# Patient Record
Sex: FEMALE | Race: WHITE
[De-identification: names, ages, dates, MRNs, and addresses within clinical notes are randomized per-mention and may not be internally consistent; named-entity substitution may affect disease eponyms.]

---

## 2020-09-07 ENCOUNTER — HOSPITAL ENCOUNTER (INPATIENT)
Dept: HOSPITAL 94 - ADULT MH | Age: 50
LOS: 7 days | Discharge: ANOTHER HOSPITAL | DRG: 751 | End: 2020-09-14
Attending: PSYCHIATRY & NEUROLOGY | Admitting: PSYCHIATRY & NEUROLOGY
Payer: MEDICAID

## 2020-09-07 VITALS — SYSTOLIC BLOOD PRESSURE: 125 MMHG | DIASTOLIC BLOOD PRESSURE: 69 MMHG

## 2020-09-07 VITALS — HEIGHT: 67 IN | WEIGHT: 124.89 LBS | BODY MASS INDEX: 19.6 KG/M2

## 2020-09-07 VITALS — SYSTOLIC BLOOD PRESSURE: 124 MMHG | DIASTOLIC BLOOD PRESSURE: 66 MMHG

## 2020-09-07 DIAGNOSIS — Z98.891: ICD-10-CM

## 2020-09-07 DIAGNOSIS — G89.29: ICD-10-CM

## 2020-09-07 DIAGNOSIS — Z82.49: ICD-10-CM

## 2020-09-07 DIAGNOSIS — M54.9: ICD-10-CM

## 2020-09-07 DIAGNOSIS — F29: ICD-10-CM

## 2020-09-07 DIAGNOSIS — F33.3: Primary | ICD-10-CM

## 2020-09-07 DIAGNOSIS — F15.10: ICD-10-CM

## 2020-09-07 DIAGNOSIS — Z90.49: ICD-10-CM

## 2020-09-07 DIAGNOSIS — F39: ICD-10-CM

## 2020-09-07 DIAGNOSIS — Z71.6: ICD-10-CM

## 2020-09-07 DIAGNOSIS — M41.9: ICD-10-CM

## 2020-09-07 DIAGNOSIS — I25.2: ICD-10-CM

## 2020-09-07 DIAGNOSIS — F17.210: ICD-10-CM

## 2020-09-07 PROCEDURE — 80061 LIPID PANEL: CPT

## 2020-09-07 PROCEDURE — 99285 EMERGENCY DEPT VISIT HI MDM: CPT

## 2020-09-07 PROCEDURE — 87081 CULTURE SCREEN ONLY: CPT

## 2020-09-07 PROCEDURE — 36415 COLL VENOUS BLD VENIPUNCTURE: CPT

## 2020-09-07 PROCEDURE — 83036 HEMOGLOBIN GLYCOSYLATED A1C: CPT

## 2020-09-07 NOTE — NUR
Admission Note: Pt. admitted at approximately 1645 from [facility], arrived in a w/c accompanied by 
tech and security. Pt. was originally BIB EMS after being found in front of an AM/PM store 
confused, disoriented, with rapid/pressured speech, and speaking in tongues. Pt. also c/o 
feeling like she was having a heart attack or a stroke, she was medically cleared at [facility]. 
Pt's toxicology screen was positive for amphetamines. She reported while there that she has 
received mental health treatment since the age of 20. Pt. was somewhat cooperative with the 
admission process, although she required frequent direction/redirection r/t tangental 
thought process and rapid, disorganized speech.

## 2020-09-07 NOTE — NUR
Nursing Progress Note:[]



Legal hold:[]



Client on voluntary/involuntary status for GD/DTS/DTO[].



Report received from nurse with use of SBAR[].



Why are they here:

 Pt. admitted at approximately 1645 from [facility], arrived in a w/c accompanied by tech and 
security. Pt. was originally BIB EMS after being found in front of an AM/PM store confused, 
disoriented, with rapid/pressured speech, and speaking in tongues. Pt. also c/o feeling like 
she was having a heart attack or a stroke, she was medically cleared at [facility]. Pt's toxicology 
screen was positive for amphetamines. She reported while there that she has received mental 
health treatment since the age of 20. Pt. was somewhat cooperative with the admission 
process, although she required frequent direction/redirection r/t tangental thought process 
and rapid, disorganized speech. 







What has happened this shift:Pt was in bed eating her dinner tray at shift change. Attempted 
to interview but pt speech was Tangential rapid with scattered thoughts. Pt was med 
compliant and went to sleep.

S/I, H/I: She denies S/I at this time, 

A/VH: Denies

Sleep: See sleep hrs

ADL's: Requires encouragement, 

Group attendance: No

Were meds taken: Yes

Any med S/E: None





Mental Status Exam



Appearance: Hair disheveled, however appropriately dressed

Eye contact: Fair, pt. 

Behavior: Cooperative, fatigued, anxious, withdrawn, and isolative

Speech: tangential

Mood: Guarded and withdrawn

Affect: Constricted

Thought process:  disorganization

Thought Content:  scattered

Cognition: A&O X(not to place)

Insight: Poor

Judgment: Poor







Interventions



PRN's used: none

Therapeutic interventions: Introduced self and established rapport, maintained a safe and 
supportive environment, ensured contract for safety, provided clear and simple instructions, 
attempted to orient to reality encouraged participation on the unit and provided direction 
as needed,  and maintained Q 15 min safety checks. 

Restraints/seclusion/emergency medication: N/A





Justification of Continued Inpatient Treatment: Pt. requires interruption of current crisis, 
medication adjustments, and a safe and supportive environment.

## 2020-09-08 VITALS — SYSTOLIC BLOOD PRESSURE: 132 MMHG | DIASTOLIC BLOOD PRESSURE: 77 MMHG

## 2020-09-08 VITALS — DIASTOLIC BLOOD PRESSURE: 77 MMHG | SYSTOLIC BLOOD PRESSURE: 132 MMHG

## 2020-09-08 VITALS — SYSTOLIC BLOOD PRESSURE: 150 MMHG | DIASTOLIC BLOOD PRESSURE: 95 MMHG

## 2020-09-08 LAB
CHOLEST SERPL-MCNC: 142 MG/DL (ref 0–200)
CHOLEST/HDLC SERPL: 2.5 {RATIO} (ref 0–4.99)
HBA1C MFR BLD: 5.8 % (ref 4.5–6.2)
HDLC SERPL-MCNC: 56 MG/DL (ref 35–60)
LDLC SERPL DIRECT ASSAY-MCNC: 71 MG/DL (ref 50–100)
TRIGL SERPL-MCNC: 57 MG/DL (ref 20–135)

## 2020-09-08 RX ADMIN — NICOTINE SCH PATCH: 21 PATCH, EXTENDED RELEASE TRANSDERMAL at 07:09

## 2020-09-08 NOTE — NUR
Nursing Progress Note:



Legal hold: 5150



Client on involuntary status for DTS



Report received from nurse with use of SBAR: SARATH Barba



Why are they here: Pt. admitted from [facility], arrived in a w/c accompanied by tech and security. 
Pt. was originally BIB EMS after being found in front of an AM/PM store confused, 
disoriented, with rapid/pressured speech, and speaking in tongues. Pt. also c/o feeling like 
she was having a heart attack or a stroke, she was medically cleared at [facility]. Pt's toxicology 
screen was positive for amphetamines. She reported while there that she has received mental 
health treatment since the age of 20. Pt. was somewhat cooperative with the admission 
process, although she required frequent direction/redirection r/t tangental thought process 
and rapid, disorganized speech. 



Assessment



What has happened this shift: Received pt. sleeping in bed, she awoke for AM blood draw and 
immediately presented with rapid disorganized speech and random strange behaviors. Pt. was 
singing and dancing in the hallway, making faces and knocking on the nurses station window, 
and pacing in the hallways. This writer provided redirection and reminded pt. that her peers 
were trying to sleep. Pt. was only able to be partially redirected, however continued to 
exhibit random behaviors and appeared anxious, PRN Ativan administered with effectiveness. 
Pt. attended breakfast in the Group Room, and 1:1 completed afterwards at bedside. During 
assessment, pt. was talking baby talk stated, "Goo goo, ga ga." However, was able to be 
intermittently redirected to answer simple yes or no questions. Pt. denies all MH s/s, 
however admits to feeling a little depressed and anxious. She makes intermittent delusional 
statements, states, "I'm Getachew's daughter." Pt. attended all groups, however again began to 
exhibit anxiety and with strange intrusive behaviors in the afternoon, additional PRN Ativan 
administered with effectiveness. Pt. napped in the afternoon, will continue to monitor. 

S/I, H/I: Denies

A/VH: Denies, however appears distracted and internally preoccupied

Sleep: Pt. reports she slept well, sleep hours are 10

ADL's: Pt. requires direction from staff

Group attendance: Yes

Were meds taken: Yes

Any med S/E: None





Mental Status Exam



Appearance: Hair disheveled, however appropriately dressed

Eye contact: Good, intense at times

Behavior: Cooperative, restless, impulsive, anxious, and hypomanic

Speech: Hyperverbal, disorganized, and mostly nonsensical

Mood: Pleasant and anxious

Affect: Animated

Thought process: Tangental and disorganized

Thought Content: Possible A/V/HA and delusions

Cognition: A&O X2

Insight: Poor

Judgment: Poor



Interventions



PRN's used: Ativan X2 and Tylenol

Therapeutic interventions: Maintained a safe and supportive environment, ensured contract 
for safety, provided clear and simple instructions, attempted to orient to reality, 
encouraged participation on the unit and provided direction, provided medication education, 
provided active listening and positive encouragement, and maintained Q 15 min safety checks. 


Restraints/seclusion/emergency medication: N/A





Justification of Continued Inpatient Treatment: Pt. requires interruption of current crisis, 
medication adjustments, and a safe and supportive environment.

## 2020-09-08 NOTE — NUR
Nursing Progress Note:



Legal hold: 5150



Client on involuntary status for DTS



Report received from nurse with use of SBAR: Zachary RN



Why are they here: Pt. admitted from [facility], arrived in a w/c accompanied by tech and security. 
Pt. was originally BIB EMS after being found in front of an AM/PM store confused, 
disoriented, with rapid/pressured speech, and speaking in tongues. Pt. also c/o feeling like 
she was having a heart attack or a stroke, she was medically cleared at [facility]. Pt's toxicology 
screen was positive for amphetamines. She reported while there that she has received mental 
health treatment since the age of 20. Pt. was somewhat cooperative with the admission 
process, although she required frequent direction/redirection r/t tangental thought process 
and rapid, disorganized speech. 



Assessment



What has happened this shift: Patient was wandering the halls talking to her self. and being 
intrusive to her peers. Her speech is disorganized and talkes in circles. She makes 
statements like your my grand father but he is the shimon of music. my family are singers and 
thats why [place] erupted. It spewed snow. Pt was given prn Ativan and was less 
intrusive and after med pass pt went to bed.

S/I, H/I: Denies

A/VH: Denies, however appears distracted and internally preoccupied

Sleep: Pt. reports she slept well, sleep hours are 10

ADL's: Pt. requires direction from staff

Group attendance: Yes

Were meds taken: Yes

Any med S/E: None





Mental Status Exam



Appearance: Hair disheveled, however appropriately dressed

Eye contact: Good, intense at times

Behavior: Cooperative, restless, impulsive, anxious, and hypomanic

Speech: Hyperverbal, disorganized, and mostly nonsensical

Mood: Pleasant and anxious

Affect: Animated

Thought process: Tangental and disorganized

Thought Content: Possible A/V/HA and delusions

Cognition: A&O X2

Insight: Poor

Judgment: Poor



Interventions



PRN's used: Ativan 

Therapeutic interventions: Maintained a safe and supportive environment, ensured contract 
for safety, provided clear and simple instructions, attempted to orient to reality, 
encouraged participation on the unit and provided direction, provided medication education, 
provided active listening and positive encouragement, and maintained Q 15 min safety checks. 


Restraints/seclusion/emergency medication: N/A





Justification of Continued Inpatient Treatment: Pt. requires interruption of current crisis, 
medication adjustments, and a safe and supportive environment.

## 2020-09-08 NOTE — NUR
Continued Group Art Therapy Notes: This was the first time this patient attended group art 
therapy. This 

patient was eager to participate, was pleasant and motivated to draw. During the course of 
the group session, this patient complete 5 successive drawings. She could not follow the 
directives, however this therapist kept the patient "in paper" to allow her a place to 'draw 
and release her what appeared to be excessive emotional content. At one point the patient 
began drawing on the tables, which is why providing a supply of paper seemed important.  It 
was difficult for this 

therapist to determine the content of her conversation.  On paper, it was observed that 
several different names were repeated.  Patient presented as friendly, however interaction 
with her peers was challenging (for others 

as again observed by this therapist. No further communications transpired.



Erin Acosta MA (Alena Marie), [facility] #[id number]

[facility], Art Therapist

-------------------------------------------------------------------------------

Addendum: 09/08/20 at 1757 by Erin EDWARDS

-------------------------------------------------------------------------------

Amended: Links added.

## 2020-09-09 VITALS — SYSTOLIC BLOOD PRESSURE: 118 MMHG | DIASTOLIC BLOOD PRESSURE: 75 MMHG

## 2020-09-09 VITALS — SYSTOLIC BLOOD PRESSURE: 111 MMHG | DIASTOLIC BLOOD PRESSURE: 65 MMHG

## 2020-09-09 RX ADMIN — NICOTINE SCH PATCH: 21 PATCH, EXTENDED RELEASE TRANSDERMAL at 08:16

## 2020-09-09 NOTE — NUR
Nursing Progress Note:



Legal hold: 5150



Client on involuntary status for DTS



Report received from nurse with use of SBAR: Zachary RN



Why are they here: Pt. admitted from [facility], arrived in a w/c accompanied by tech and security. 
Pt. was originally BIB EMS after being found in front of an AM/PM store confused, 
disoriented, with rapid/pressured speech, and speaking in tongues. Pt. also c/o feeling like 
she was having a heart attack or a stroke, she was medically cleared at [facility]. Pt's toxicology 
screen was positive for amphetamines. She reported while there that she has received mental 
health treatment since the age of 20. Pt. was somewhat cooperative with the admission 
process, although she required frequent direction/redirection r/t tangental thought process 
and rapid, disorganized speech. 



Assessment



What has happened this shift: Patient was in her room at change of shift talking to her 
self. She stated I'm talking to my Grand father. She told this writer "I can see your soul 
and future." She would  aphone and dial few numbers and have a long conversation and 
you could hear the recording call can not be completed as dilled. Pt was med compliant and 
less intrusive this shift.

S/I, H/I: Denies

A/VH: Denies, however appears distracted and internally preoccupied

Sleep: Pt. reports she slept well, sleep hours are 7.25, naps during the day

ADL's: Pt. requires direction from staff

Group attendance: Yes

Were meds taken: Yes

Any med S/E: None





Mental Status Exam



Appearance: Hair disheveled, however appropriately dressed

Eye contact: Good, intense at times

Behavior: Cooperative, restless, impulsive, anxious, and hypomanic

Speech: Hyperverbal, disorganized, and mostly nonsensical

Mood: Pleasant and anxious

Affect: Animated

Thought process: Tangental and disorganized

Thought Content: Possible A/V/HA and delusions

Cognition: A&O X2

Insight: Poor

Judgment: Poor



Interventions



PRN's used: Ativan X1 and Tylenol

Therapeutic interventions: Maintained a safe and supportive environment, ensured contract 
for safety, provided clear and simple instructions, attempted to orient to reality, 
encouraged participation on the unit and provided direction, provided medication education, 
provided active listening and positive encouragement, and maintained Q 15 min safety checks. 


Restraints/seclusion/emergency medication: N/A





Justification of Continued Inpatient Treatment: Pt. requires interruption of current crisis, 
medication adjustments, and a safe and supportive environment.

## 2020-09-09 NOTE — NUR
Nursing Progress Note:



Legal hold: 5150



Client on involuntary status for DTS



Report received from nurse with use of SBAR: SARATH Barba



Why are they here: Pt. admitted from [facility], arrived in a w/c accompanied by tech and security. 
Pt. was originally BIB EMS after being found in front of an AM/PM store confused, 
disoriented, with rapid/pressured speech, and speaking in tongues. Pt. also c/o feeling like 
she was having a heart attack or a stroke, she was medically cleared at [facility]. Pt's toxicology 
screen was positive for amphetamines. She reported while there that she has received mental 
health treatment since the age of 20. Pt. was somewhat cooperative with the admission 
process, although she required frequent direction/redirection r/t tangental thought process 
and rapid, disorganized speech. 



Assessment



What has happened this shift: Received pt. sleeping in bed, she was awoken by staff to 
attend breakfast, and afterwards retreated back to bed. Pt. presented as more clear this 
morning and greets this writer appropriately, states, "I slept well." However, after 
breakfast pt. again began to present with strange behaviors AEB singing loudly and dancing 
in the hallway and was observed to be talking aloud to herself in a disorganized and 
nonsensical manner. Pt. isolated and slept throughout the morning and awoke in the 
afternoon. 1:1 completed at bedside, pt. continues to deny all MH s/s, however admits to 
having anxiety. When questioned by this writer in regard to her anxiety, pt. states, "I had 
six abortions." She reports she has two living children in [place] where she is from, 
however then begins to make delusional statements. She reports she has not seen her children 
because she has been on a, "Yazdanism walk to [place]." Pt. then reports that she has WINNIE, 
and when asked to clarify this, stated, "It's extra-sensory perception, I can control the 
people around me." Pt. then began to speak in a non-sense what appeared to be a made-up 
language. She exhibited increased anxiety AEB pacing the hallway and becoming intrusive with 
peers and was only able to be partially redirected, PRN Ativan administered with 
effectiveness. Pt. attended art group and remained up throughout the afternoon, continuing 
to attempt to interact with her peers, however continues to present as disorganized with 
rapid non-sensical speech. 

S/I, H/I: Denies

A/VH: Denies, however appears distracted and internally preoccupied

Sleep: Pt. reports she slept well, sleep hours are 7.25, naps during the day

ADL's: Pt. requires direction from staff

Group attendance: Yes

Were meds taken: Yes

Any med S/E: None





Mental Status Exam



Appearance: Hair disheveled, however appropriately dressed

Eye contact: Good, intense at times

Behavior: Cooperative, restless, impulsive, anxious, and hypomanic

Speech: Hyperverbal, disorganized, and mostly nonsensical

Mood: Pleasant and anxious

Affect: Animated

Thought process: Tangental and disorganized

Thought Content: Possible A/V/HA and delusions

Cognition: A&O X2

Insight: Poor

Judgment: Poor



Interventions



PRN's used: Ativan X1 and Tylenol

Therapeutic interventions: Maintained a safe and supportive environment, ensured contract 
for safety, provided clear and simple instructions, attempted to orient to reality, 
encouraged participation on the unit and provided direction, provided medication education, 
provided active listening and positive encouragement, and maintained Q 15 min safety checks. 


Restraints/seclusion/emergency medication: N/A





Justification of Continued Inpatient Treatment: Pt. requires interruption of current crisis, 
medication adjustments, and a safe and supportive environment.

## 2020-09-10 VITALS — SYSTOLIC BLOOD PRESSURE: 112 MMHG | DIASTOLIC BLOOD PRESSURE: 52 MMHG

## 2020-09-10 VITALS — DIASTOLIC BLOOD PRESSURE: 84 MMHG | SYSTOLIC BLOOD PRESSURE: 127 MMHG

## 2020-09-10 RX ADMIN — NICOTINE SCH PATCH: 21 PATCH, EXTENDED RELEASE TRANSDERMAL at 07:50

## 2020-09-10 NOTE — NUR
Nursing Progress Note:



Legal hold: 5150



Client on involuntary status for DTS



Report received from RN with use of SBAR



Why are they here: Pt. admitted from [facility], arrived in a w/c accompanied by tech and security. 
Pt. was originally BIB EMS after being found in front of an AM/PM store confused, 
disoriented, with rapid/pressured speech, and speaking in tongues. Pt. also c/o feeling like 
she was having a heart attack or a stroke, she was medically cleared at [facility]. Pt's toxicology 
screen was positive for amphetamines. She reported while there that she has received mental 
health treatment since the age of 20. Pt. was somewhat cooperative with the admission 
process, although she required frequent direction/redirection r/t tangental thought process 
and rapid, disorganized speech. 



Assessment



What has happened this shift: Received pt. sleeping in bed w/o distress at beginning of 
shift. Pt was cooperative with vitals, AM meds, and morning assessments, although she speaks 
in word salad and disorganized.  Pt ate meals well with others and attended part of the AM 
group. Pt paced halls a lot with a Bible in her hand and making statements that were partial 
quotes from the bible mixed with delusional and unrelated statements. Pt shows concern for 
her roommate and encourages her to engage in daily life/activities. Pt is intrusive at times 
and handles redirection ok. It is difficult to have a meaningful conversation as she starts 
to freely associate and make random statements about words spoken by others in her vicinity. 




S/I, H/I: Denies

A/VH: Denies, however appears distracted and internally preoccupied

Sleep: None this shift

ADL's: Pt. requires direction from staff

Group attendance: Yes

Were meds taken: Yes

Any med S/E: None





Mental Status Exam



Appearance: Hair disheveled, however appropriately dressed

Eye contact: Good, intense at times

Behavior: Cooperative, restless, impulsive, anxious, and hypomanic

Speech: Hyperverbal, disorganized

Mood: Pleasant and anxious

Affect: Animated

Thought process: Tangental and disorganized

Thought Content: Possible A/V/HA and delusions

Cognition: A&O X2

Insight: Poor

Judgment: Poor



Interventions



PRN's used: Ativan and Tyllenol

Therapeutic interventions: Maintained a safe and supportive environment, ensured contract 
for safety, provided clear and simple instructions, attempted to orient to reality, 
encouraged participation on the unit and provided direction, provided medication education, 
provided active listening and positive encouragement, and maintained Q 15 min safety checks. 




Restraints/seclusion/emergency medication: N/A



Justification of Continued Inpatient Treatment: Pt. requires interruption of current crisis, 
medication adjustments, and a safe and supportive environment.

## 2020-09-11 VITALS — SYSTOLIC BLOOD PRESSURE: 122 MMHG | DIASTOLIC BLOOD PRESSURE: 82 MMHG

## 2020-09-11 VITALS — DIASTOLIC BLOOD PRESSURE: 84 MMHG | SYSTOLIC BLOOD PRESSURE: 137 MMHG

## 2020-09-11 RX ADMIN — NICOTINE SCH PATCH: 21 PATCH, EXTENDED RELEASE TRANSDERMAL at 08:44

## 2020-09-11 RX ADMIN — LOPERAMIDE HYDROCHLORIDE PRN MG: 2 CAPSULE ORAL at 11:26

## 2020-09-11 NOTE — NUR
Group Therapy:  Process Group



This Clinicians goals for this process group were as follows: (1) Ask scaling questions 
about Patients current anxiety, depression, and irritability symptoms as a check-in. (2) 
Share psychoeducation about emotional escalation as it relates to stress and negative 
symptoms, Fight, flight, freeze. (2) Provide psychoeducation on the STOPP acronym: Stop, 
Take a Breath, Observe the situation, Put things into perspective, and, Practice what works. 
(3) Share psychoeducation on principles of mindfulness and emotional relaxation techniques 
that Patients may utilize to reduce the acuity of unwanted emotional escalation. (5) 
Process Clients thoughts and reflections on this topic within the group milieu.  



Patient identified experiencing the following levels of anxiety, depression, and 
anger/irritability while present in the group milieu (0-low; 10-High).



Anxiety: 1/10

Depression: 2/10

Anger/irritability: 0/10



Patient presented as properly oriented to person, and place during the process group. 
Patient was dressed in green hospital scrubs within the milieu. Patient frequently moved 
around in her chair while uttering incoherent sounds. Patient's thought content was 
disorganized.  Patient's thought process was scattered. She engaged in the behavior of 
echolalia, often repeating words and phrases that this Clinician spoke out loud. Patient 
presented as slightly hyperverbal. She maintained intermittent eye contact with this 
Clinician. Patient presented in calm, euphoric mood, during the process group. Patient 
provided answers to scaling questions about the intensity of her anxiety, depression, and 
anger/irritability symptoms.  Approximately 15 minutes into the process group, Patient 
became incontinent and left the group room, which served as a distraction.  [facility] staff 
assisted with the situation and this Clinician continued to facilitate the process group.



Ankit Murray MA, WINDY


-------------------------------------------------------------------------------

Addendum: 09/14/20 at 0821 by Ankit Murray 

-------------------------------------------------------------------------------

Amended: Links added.

## 2020-09-11 NOTE — NUR
Nursing Progress Note:

Legal hold: 5150

Client on involuntary status for DTS

Report received from Tim CLEMENS with use of SBAR

Why are they here: Pt. admitted from [facility], arrived in a w/c accompanied by tech and security. 
Pt. was originally BIB EMS after being found in front of an AM/PM store confused, 
disoriented, with rapid/pressured speech, and speaking in tongues. Pt. also c/o feeling like 
she was having a heart attack or a stroke, she was medically cleared at [facility]. Pt's toxicology 
screen was positive for amphetamines. She reported while there that she has received mental 
health treatment since the age of 20. Pt. was somewhat cooperative with the admission 
process, although she required frequent direction/redirection r/t tangental thought process 
and rapid, disorganized speech. 

Assessment

What has happened this shift: RN received pt. awake, pacing in hallways and talking loudly 
to herself and read her Bible out loud. Pt. ate breakfast and took medications. 1:1 done at 
bedside, pt. denies SI/HI, A/V hallucinations. Pt. carrying on nonsensical conversation 
while RN assessed her. Pt. saying words in Danish, when RN asked her about her language 
ability pt. stated, I speak 26 languages. Pt. reports her mood is good. Pt. c/o 
constipation and given milk of magnesia. Pt. was incontinent of urine during group. Shortly 
after pt. was incontinent of stool x2, with diarrhea. Pt. given Imodium and showered. Pt. 
carries on continuous conversation with herself, singing, and reading the Bible throughout 
the day. 

S/I, H/I: Denies

A/VH: Denies but responding to internal stimuli. 

Sleep: Did not nap

ADL's: Needs prompting. Pt. showered after bouts of incontinence.  

Group attendance: No

Were meds taken: Yes

Any med S/E: None

Mental Status Exam

Appearance: Clean but disheveled, wearing unit scrubs. 

Eye contact: Good, intense at times

Behavior: Cooperative, restless, impulsive, anxious, and hypomanic

Speech: Hyperverbal, pressured, disorganized speech

Mood: Pleasant but anxious

Affect: Animated

Thought process: Tangential, disorganized, delusions. 

Thought Content: Delusions

Cognition: A&O X3 (Not to circumstance)

Insight: Poor

Judgment: Poor

Interventions

PRN's used: Ativan, MOM, Imodium. 

Therapeutic interventions: Maintained a safe and supportive environment, ensured contract 
for safety, provided clear and simple instructions, attempted to orient to reality, 
encouraged participation on the unit and provided direction, provided medication education, 
provided active listening and positive encouragement, and maintained Q 15 min safety checks. 


Restraints/seclusion/emergency medication: N/A

Justification of Continued Inpatient Treatment: Pt. requires interruption of current crisis, 
medication adjustments, and a safe and supportive environment.

## 2020-09-11 NOTE — NUR
Nursing Progress Note:



Legal hold: 5150



Client on involuntary status for DTS



Report received from nurse with use of SBAR: Zachary RN



Why are they here: Pt. admitted from [facility], arrived in a w/c accompanied by tech and security. 
Pt. was originally BIB EMS after being found in front of an AM/PM store confused, 
disoriented, with rapid/pressured speech, and speaking in tongues. Pt. also c/o feeling like 
she was having a heart attack or a stroke, she was medically cleared at [facility]. Pt's toxicology 
screen was positive for amphetamines. She reported while there that she has received mental 
health treatment since the age of 20. Pt. was somewhat cooperative with the admission 
process, although she required frequent direction/redirection r/t tangental thought process 
and rapid, disorganized speech. 



Assessment



What has happened this shift: Pt was active on unit all evening, walking the halls and 
playing with puzzle pieces and torn up pieces of paper.  pt is disorganized and can't carry 
on a conversation due to her pressured speech.  She will speak nonstop about nonsensical 
things when trying to ascertain how she is doing.  Pt showered and had snack with other 
pt's, and despite being disorganized, pt is cooperative and friendly.  



S/I, H/I: Denies

A/VH: Denies, however appears distracted and internally preoccupied

Sleep: see sleep assessment

ADL's: Pt. requires direction from staff , showered tonight

Group attendance: Yes

Were meds taken: Yes

Any med S/E: None





Mental Status Exam



Appearance: wnl

Eye contact: fair

Behavior: restless, impulsive, anxious, and hypomanic

Speech: Hyperverbal, disorganized, and mostly nonsensical

Mood: Pleasant and anxious

Affect: Animated

Thought process: Tangental and disorganized

Thought Content: Possible A/V/HA and delusions

Cognition: A&O X2

Insight: Poor

Judgment: Poor



Interventions



PRN's used: Ativan X1 and Tylenol

Therapeutic interventions: Maintained a safe and supportive environment, ensured contract 
for safety, provided clear and simple instructions, attempted to orient to reality, 
encouraged participation on the unit and provided direction, provided medication education, 
provided active listening and positive encouragement, and maintained Q 15 min safety checks. 


Restraints/seclusion/emergency medication: N/A





Justification of Continued Inpatient Treatment: Pt. requires interruption of current crisis, 
medication adjustments, and a safe and supportive environment.

## 2020-09-12 VITALS — SYSTOLIC BLOOD PRESSURE: 104 MMHG | DIASTOLIC BLOOD PRESSURE: 63 MMHG

## 2020-09-12 VITALS — DIASTOLIC BLOOD PRESSURE: 81 MMHG | SYSTOLIC BLOOD PRESSURE: 129 MMHG

## 2020-09-12 RX ADMIN — DIVALPROEX SODIUM SCH MG: 250 TABLET, DELAYED RELEASE ORAL at 20:06

## 2020-09-12 RX ADMIN — NICOTINE SCH PATCH: 21 PATCH, EXTENDED RELEASE TRANSDERMAL at 08:02

## 2020-09-12 NOTE — NUR
Nursing Progress Note:



Legal hold: 5150



Client on involuntary status for DTS



Report received from nurse with use of SBAR: SARATH Dumont



Why are they here: Pt. admitted from [facility], arrived in a w/c accompanied by tech and security. 
Pt. was originally BIB EMS after being found in front of an AM/PM store confused, 
disoriented, with rapid/pressured speech, and speaking in tongues. Pt. also c/o feeling like 
she was having a heart attack or a stroke, she was medically cleared at [facility]. Pt's toxicology 
screen was positive for amphetamines. She reported while there that she has received mental 
health treatment since the age of 20. Pt. was somewhat cooperative with the admission 
process, although she required frequent direction/redirection r/t tangental thought process 
and rapid, disorganized speech. 



Assessment



What has happened this shift: pt continues to be disorganized, tangential and has pressured 
speech.  She spent the evening talking to herself using word salad and will repeat whatever 
is heard around her.  Pt cannot have a conversation as she is too preoccupied internally and 
cant stop talking long enough to be able to communicate with.  When asked if she hears 
voices she responded, I can hear your voice, voice of anything, I can hear anything, 
everything happens.  



S/I, H/I: Denies

A/VH: Denies, however appears distracted and internally preoccupied

Sleep: see sleep assessment

ADL's: Pt. requires direction from staff , showered tonight

Group attendance: Yes

Were meds taken: Yes

Any med S/E: None





Mental Status Exam



Appearance: wnl, wearing green scrubs

Eye contact: good

Behavior: restless, impulsive, anxious, and hypomanic

Speech: Hyperverbal, disorganized, and mostly nonsensical

Mood: Pleasant and anxious

Affect: Animated

Thought process: Tangental and disorganized

Thought Content: Possible A/V/HA and delusions

Cognition: A&O X2

Insight: Poor

Judgment: Poor



Interventions



PRN's used: Ativan X1

Therapeutic interventions: Maintained a safe and supportive environment, ensured contract 
for safety, provided clear and simple instructions, attempted to orient to reality, 
encouraged participation on the unit and provided direction, provided medication education, 
provided active listening and positive encouragement, and maintained Q 15 min safety checks. 


Restraints/seclusion/emergency medication: N/A





Justification of Continued Inpatient Treatment: Pt. requires interruption of current crisis, 
medication adjustments, and a safe and supportive environment.

## 2020-09-12 NOTE — NUR
Nursing Progress Note:



Legal hold: 5150



Client on involuntary status for DTS



Report received from SARATH Rea with use of SBAR: 



Why are they here: Pt. admitted from [facility], arrived in a w/c accompanied by tech and security. 
Pt. was originally BIB EMS after being found in front of an AM/PM store confused, 
disoriented, with rapid/pressured speech, and speaking in tongues. Pt. also c/o feeling like 
she was having a heart attack or a stroke, she was medically cleared at [facility]. Pt's toxicology 
screen was positive for amphetamines. She reported while there that she has received mental 
health treatment since the age of 20. Pt. was somewhat cooperative with the admission 
process, although she required frequent direction/redirection r/t tangential thought process 
and rapid, disorganized speech. 



Assessment



What has happened this shift: Pt was having conversations with internal stimuli all day.  
She was pleasant and kind. Occasionally she had a lucid conversation with RN but then went 
back to tangential incoherent conversation. She talked once on the phone to her parents. She 
was tangential all day while singing or reading aloud. She played cards. She socialized some 
with other residents. 



S/I, H/I: Pt Denies

A/VH: Responds to internal stimuli 

Sleep: No naps during the day 

ADL's: Independent.

Group attendance: No group

Were meds taken: Yes

Any med S/E: None noted





Mental Status Exam



Appearance: Wearing green scrubs

Eye contact: Direct

Behavior: Sings and has conversations all day with internal stimuli. 

Speech: Pressured. Tangential. Incoherent conversation most of the time with brief moments 
being lucid. 

Mood: Pleasant 

Affect: Bright

Thought process: Tangential 

Thought Content: Unable to assess

Cognition: Alert

Insight: Poor

Judgment: Poor



Interventions



PRN's used: None

Therapeutic interventions: Maintained a safe and supportive environment, ensured contract 
for safety, provided clear and simple instructions, attempted to orient to reality, 
encouraged participation on the unit and provided direction, provided medication education, 
provided active listening and positive encouragement, and maintained Q 15 min safety checks. 


Restraints/seclusion/emergency medication: N/A





Justification of Continued Inpatient Treatment: Pt. requires interruption of current crisis, 
medication adjustments, and a safe and supportive environment.

## 2020-09-12 NOTE — NUR
Initial: Pt admit with psychosis. Currently on a regular diet documented 

with 100% PO intake throughout LOS meeting nutrient needs. LBM 9/11. No 

edema or wounds. No nutrition diagnosis at this time. Will continue to 

follow.

Recommendations:

1) Continue regular diet

2) Bowel care PRN

3) Scaled weights per rx

-------------------------------------------------------------------------------

Addendum: 09/12/20 at 1029 by Jazmyne Calvin RD

-------------------------------------------------------------------------------

Amended: Links added.

## 2020-09-13 VITALS — SYSTOLIC BLOOD PRESSURE: 125 MMHG | DIASTOLIC BLOOD PRESSURE: 80 MMHG

## 2020-09-13 VITALS — DIASTOLIC BLOOD PRESSURE: 60 MMHG | SYSTOLIC BLOOD PRESSURE: 102 MMHG

## 2020-09-13 RX ADMIN — DIVALPROEX SODIUM SCH MG: 250 TABLET, DELAYED RELEASE ORAL at 20:20

## 2020-09-13 RX ADMIN — LOPERAMIDE HYDROCHLORIDE PRN MG: 2 CAPSULE ORAL at 07:36

## 2020-09-13 RX ADMIN — NICOTINE SCH PATCH: 21 PATCH, EXTENDED RELEASE TRANSDERMAL at 08:12

## 2020-09-13 NOTE — NUR
Nursing Progress Note:



Legal hold: 5150



Client on involuntary status for DTS



Report received from nurse with use of SBAR: SARATH Dumont



Why are they here: Pt. admitted from [facility], arrived in a w/c accompanied by tech and security. 
Pt. was originally BIB EMS after being found in front of an AM/PM store confused, 
disoriented, with rapid/pressured speech, and speaking in tongues. Pt. also c/o feeling like 
she was having a heart attack or a stroke, she was medically cleared at [facility]. Pt's toxicology 
screen was positive for amphetamines. She reported while there that she has received mental 
health treatment since the age of 20. Pt. was somewhat cooperative with the admission 
process, although she required frequent direction/redirection r/t tangental thought process 
and rapid, disorganized speech. 



Assessment



What has happened this shift: pt is still disorganized, rapid speech, mostly word salad.  pt 
was able to remember this writers name for the first time and was able to almost have a 
conversation though.  Pt is friendly, cooperative and generally pleasant.  Pt was speaking 
loudly in her room and was asked to move to the group room to give her roommate some 
privacy.  Pt was happy to do this and was able to understand that she was being intrusive.  
Pt accepted all assessments well and did not have issue taking hs meds.  pt was started on 
depakote tonight.   pt sleeps well at night.   



S/I, H/I: Denies

A/VH: Denies, however appears distracted and internally preoccupied

Sleep: see sleep assessment

ADL's: Pt. requires direction from staff , showered tonight

Group attendance: Yes

Were meds taken: Yes

Any med S/E: None





Mental Status Exam



Appearance: wnl, wearing green scrubs

Eye contact: good

Behavior: restless, impulsive, anxious, and hypomanic

Speech: Hyperverbal, disorganized, and mostly nonsensical

Mood: Pleasant and anxious

Affect: Animated

Thought process: Tangental and disorganized

Thought Content: Possible A/V/HA and delusions

Cognition: A&O X2

Insight: Poor

Judgment: Poor



Interventions



PRN's used: Ativan X1

Therapeutic interventions: Maintained a safe and supportive environment, ensured contract 
for safety, provided clear and simple instructions, attempted to orient to reality, 
encouraged participation on the unit and provided direction, provided medication education, 
provided active listening and positive encouragement, and maintained Q 15 min safety checks. 


Restraints/seclusion/emergency medication: N/A





Justification of Continued Inpatient Treatment: Pt. requires interruption of current crisis, 
medication adjustments, and a safe and supportive environment.

## 2020-09-13 NOTE — NUR
Nursing Progress Note:



Legal hold: 5150



Client on involuntary status for DTS



Report received from nurse with use of SBAR: SARATH Dumont



Why are they here: Pt. admitted from [facility], arrived in a w/c accompanied by tech and security. 
Pt. was originally BIB EMS after being found in front of an AM/PM store confused, 
disoriented, with rapid/pressured speech, and speaking in tongues. Pt. also c/o feeling like 
she was having a heart attack or a stroke, she was medically cleared at [facility]. Pt's toxicology 
screen was positive for amphetamines. She reported while there that she has received mental 
health treatment since the age of 20. Pt. was somewhat cooperative with the admission 
process, although she required frequent direction/redirection r/t tangental thought process 
and rapid, disorganized speech. 



Assessment



What has happened this shift: Patient was in the alanis having rambling conversations with 
family members and [place]. Pt sounds like she is on the phone but there is no phone in her 
hand. She was singing in her room and dancing. Pt was med compliant needed some redirection 
and went to bed after snack.



S/I, H/I: Denies

A/VH: Denies, however appears distracted and internally preoccupied

Sleep: Patient napped after breakfast

ADL's: Independent

Group attendance: No group activity today

Were meds taken: Yes

Any med S/E: None





Mental Status Exam



Appearance: Clean and hair up in ponytail, wearing green scrubs

Eye contact: good

Behavior: restless, impulsive, anxious, and hypomanic

Speech: Hyperverbal, disorganized, and mostly nonsensical

Mood: Pleasant and anxious

Affect: Animated

Thought process: Tangental and disorganized

Thought Content: Possible A/V/HA and delusions

Cognition: A&O X2

Insight: Poor

Judgment: Poor



Interventions



PRN's used: None

Therapeutic interventions: Maintained a safe and supportive environment, ensured contract 
for safety, provided clear and simple instructions, attempted to orient to reality, 
encouraged participation on the unit and provided direction, provided medication education, 
provided active listening and positive encouragement, and maintained Q 15 min safety checks. 


Restraints/seclusion/emergency medication: N/A





Justification of Continued Inpatient Treatment: Pt. requires interruption of current crisis, 
medication adjustments, and a safe and supportive environment.

## 2020-09-13 NOTE — NUR
Nursing Progress Note:



Legal hold: 5150



Client on involuntary status for DTS



Report received from nurse with use of SBAR: SARATH Dumont



Why are they here: Pt. admitted from [facility], arrived in a w/c accompanied by tech and security. 
Pt. was originally BIB EMS after being found in front of an AM/PM store confused, 
disoriented, with rapid/pressured speech, and speaking in tongues. Pt. also c/o feeling like 
she was having a heart attack or a stroke, she was medically cleared at [facility]. Pt's toxicology 
screen was positive for amphetamines. She reported while there that she has received mental 
health treatment since the age of 20. Pt. was somewhat cooperative with the admission 
process, although she required frequent direction/redirection r/t tangental thought process 
and rapid, disorganized speech. 



Assessment



What has happened this shift: Patient talks almost constantly, some makes sense some does 
not and time it is just word after word. Patient went to all meals. Patient napped after 
breakfast and worked out in rec room. Patient walked halls while talking to self. Patient 
sat on room floor next to neighbor's bed coloring while talking to other patient. Patient 
demonstrates concern for others and alerted this nurse to another patient's needs. Patient 
singing in room.      



S/I, H/I: Denies

A/VH: Denies, however appears distracted and internally preoccupied

Sleep: Patient napped after breakfast

ADL's: Independent

Group attendance: No group activity today

Were meds taken: Yes

Any med S/E: None





Mental Status Exam



Appearance: Clean and hair up in ponytail, wearing green scrubs

Eye contact: good

Behavior: restless, impulsive, anxious, and hypomanic

Speech: Hyperverbal, disorganized, and mostly nonsensical

Mood: Pleasant and anxious

Affect: Animated

Thought process: Tangental and disorganized

Thought Content: Possible A/V/HA and delusions

Cognition: A&O X2

Insight: Poor

Judgment: Poor



Interventions



PRN's used: None

Therapeutic interventions: Maintained a safe and supportive environment, ensured contract 
for safety, provided clear and simple instructions, attempted to orient to reality, 
encouraged participation on the unit and provided direction, provided medication education, 
provided active listening and positive encouragement, and maintained Q 15 min safety checks. 


Restraints/seclusion/emergency medication: N/A





Justification of Continued Inpatient Treatment: Pt. requires interruption of current crisis, 
medication adjustments, and a safe and supportive environment.

## 2020-09-14 VITALS — SYSTOLIC BLOOD PRESSURE: 91 MMHG | DIASTOLIC BLOOD PRESSURE: 62 MMHG

## 2020-09-14 RX ADMIN — NICOTINE SCH PATCH: 21 PATCH, EXTENDED RELEASE TRANSDERMAL at 08:16

## 2020-09-14 NOTE — NUR
Group Therapy:  Process Group



This Clinicians goal for this process group were as follows: (1) Ask scaling questions 
about Patients current anxiety, depression, and irritability symptoms as a check-in. (2) 
Provide psychoeducation on emotional and situational stressors. (3) Discuss thoughts and 
feelings that patients experience when they have experienced an emotional and/or situational 
stressor. (4) Provide psychoeducation on interventions, as actions patients can take to 
reduce feelings of emotional escalation caused by situational and emotional stressors. (5) 
Process Patients thoughts and reflections on this topic within the group milieu.  



Patient identified experiencing the following levels of anxiety, depression, and 
anger/irritability while present in the group milieu (0-low; 10-High).



Anxiety: 0.5/10

Depression: 0/10

Anger/irritability: 0/10



Patient presented as properly oriented x4 during the process group. Patient was dressed in 
green hospital scrubs within the milieu. This Clinician observed Patient coloring with a 
green crayola marker that she secured.  This Clinician asked Patient what she was doing.  
She stated that she was writing a letter to, "Suzy and her Daddy." On a few occasions, 
Patient put the covered end of the marker in her mouth. Patient gestured with her hands on 
occasion, sometimes holding her arms above her head as she colored at her seat. Patient's 
thought content was clear, and concrete.  Patient's thought process varied between being 
clear, coherent, and linear, and being marked by tangential thinking and flight of ideas. 
Patient presented as slightly hyperverbal. The rate, latency, and tone of Patients speech 
was within normal limits. This Clinician verbally prompted Patient to quiet down on a few 
different occasions during the process group, as she frequently spoke over this 
Clinician--and at one time began singing a song. On at least one occasion, she began 
speaking a language that this Clinician did not understand. Patient maintained intermittent 
eye contact with this Clinician. Patient presented in calm euthymic mood, with congruent 
affect during the process group. Patient presented as open and cooperative, was verbally 
engaged and mildly intrusive within the group milieu, due to her hypomanic behaviors. 
Patient occasionally made comments that indicated that she was tracking with the information 
that was being presented on how to practice adaptive coping skills to reduce the acuity of 
unwanted mental health symptoms when exposed to stressful situations.



Ankit Murray MA, LMFT


-------------------------------------------------------------------------------

Addendum: 09/15/20 at 0815 by Ankit EDWARDS

-------------------------------------------------------------------------------

Amended: Links added.

## 2020-09-14 NOTE — NUR
-DCP



Presenting Issues: 

Per 5250 Hearing, pt was determined to no longer meet LPS GD criteria.  Pt expressed that 
she understood that this means she can be d/c from [facility]. 



Interventions:

SS accompanied Charge RN and met w/pt to establish her d/c destination. Per session, pt 
wants to be d/c and plans to return to the [facility], but will go to the [place] 
for now. Pt was offered transportation to the [place] and accepted offer of a cab ride. 



Plan: 

Pt to d/c today. 

Yvonne Prabhakar LCSW

-------------------------------------------------------------------------------

Addendum: 09/14/20 at 1621 by Yvonne EDWARDS

-------------------------------------------------------------------------------

Amended: Links added.

## 2020-09-14 NOTE — NUR
Nursing Progress Note:



Legal hold: [id number]



Client on involuntary status for DTS



Report received from SARATH Dumont with use of SBAR: 



Why are they here: Pt. admitted from [facility], arrived in a w/c accompanied by tech and security. 
Pt. was originally BIB EMS after being found in front of an [employer] store confused, 
disoriented, with rapid/pressured speech, and speaking in tongues. Pt. also c/o feeling like 
she was having a heart attack or a stroke, she was medically cleared at [facility]. Pt's toxicology 
screen was positive for amphetamines. She reported while there that she has received mental 
health treatment since the age of 20. Pt. was somewhat cooperative with the admission 
process, although she required frequent direction/redirection r/t tangential thought process 
and rapid, disorganized speech. 



Assessment



What has happened this shift: Patient was asleep at change of shift and up for breakfast.  
Patient is disorganized and says random things and picks up words for nearby conversations.  
Patient came out of her room this morning and says Holy Ghost and holy grail.  Patient is 
pleasantly psychotic.  Patient is a little better than when RN last saw her a couple of days 
ago.  Patient is very attentive to her roommate who needs assistance.  She is very loving 
and concerned for her.  Patient denies suicidal/homicidal ideation. Patient denies 
audio/visual hallucinations but often appears to be responding to internal stimuli. 



S/I, H/I: Pt Denies

A/VH: denies but appears to be responding to internal stimuli 

Sleep: No naps during the day 

ADL's: Independent.

Group attendance: No group

Were meds taken: Yes

Any med S/E: None noted





Mental Status Exam



Appearance: Wearing green scrubs, hair brushed and neat. 

Eye contact: Direct

Behavior: Sings and has conversations all day with internal stimuli. 

Speech: Pressured. Tangential. Incoherent conversation most of the time with brief moments 
being lucid. 

Mood: Pleasant 

Affect: Bright

Thought process: Tangential 

Thought Content: Unable to assess

Cognition: Alert

Insight: Poor

Judgment: Poor



Interventions



PRN's used: None

Therapeutic interventions: Maintained a safe and supportive environment, ensured contract 
for safety, provided clear and simple instructions, attempted to orient to reality, 
encouraged participation on the unit and provided direction, provided medication education, 
provided active listening and positive encouragement, and maintained Q 15 min safety checks. 


Restraints/seclusion/emergency medication: N/A





Justification of Continued Inpatient Treatment: Pt. requires interruption of current crisis, 
medication adjustments, and a safe and supportive environment.

## 2020-09-14 NOTE — NUR
RN Discharge Note:  Patient denies suicidal/homicidal ideation.  Patient is taking a taxi to 
get her meds at [facility] and then she is going to the mission.  Patient given a warm 
sweatshirt and a beanie for the cold.  Patient has all her belongings.  Patient is happy to 
be leaving.  Patient ambulatory, steady gait with Tech Sabina to the lobby to await taxi.